# Patient Record
Sex: MALE | Race: WHITE | NOT HISPANIC OR LATINO | URBAN - METROPOLITAN AREA
[De-identification: names, ages, dates, MRNs, and addresses within clinical notes are randomized per-mention and may not be internally consistent; named-entity substitution may affect disease eponyms.]

---

## 2023-11-08 ENCOUNTER — OFFICE VISIT (OUTPATIENT)
Dept: OTOLARYNGOLOGY | Facility: CLINIC | Age: 48
End: 2023-11-08
Payer: COMMERCIAL

## 2023-11-08 ENCOUNTER — CLINICAL SUPPORT (OUTPATIENT)
Dept: AUDIOLOGY | Facility: CLINIC | Age: 48
End: 2023-11-08
Payer: COMMERCIAL

## 2023-11-08 DIAGNOSIS — H90.41 SENSORINEURAL HEARING LOSS (SNHL) OF RIGHT EAR WITH UNRESTRICTED HEARING OF LEFT EAR: ICD-10-CM

## 2023-11-08 DIAGNOSIS — H90.3 ASNHL (ASYMMETRICAL SENSORINEURAL HEARING LOSS): Primary | ICD-10-CM

## 2023-11-08 DIAGNOSIS — H93.233 HYPERACUSIS OF BOTH EARS: ICD-10-CM

## 2023-11-08 DIAGNOSIS — H93.293 ABNORMAL AUDITORY PERCEPTION OF BOTH EARS: Primary | ICD-10-CM

## 2023-11-08 DIAGNOSIS — H93.293 ABNORMAL AUDITORY PERCEPTION OF BOTH EARS: ICD-10-CM

## 2023-11-08 PROCEDURE — 92557 PR COMPREHENSIVE HEARING TEST: ICD-10-PCS | Mod: S$GLB,,, | Performed by: AUDIOLOGIST

## 2023-11-08 PROCEDURE — 92567 PR TYMPA2METRY: ICD-10-PCS | Mod: S$GLB,,, | Performed by: AUDIOLOGIST

## 2023-11-08 PROCEDURE — 99999 PR PBB SHADOW E&M-EST. PATIENT-LVL I: ICD-10-PCS | Mod: PBBFAC,,, | Performed by: AUDIOLOGIST

## 2023-11-08 PROCEDURE — 1159F MED LIST DOCD IN RCRD: CPT | Mod: CPTII,S$GLB,,

## 2023-11-08 PROCEDURE — 99203 OFFICE O/P NEW LOW 30 MIN: CPT | Mod: S$GLB,,,

## 2023-11-08 PROCEDURE — 1159F PR MEDICATION LIST DOCUMENTED IN MEDICAL RECORD: ICD-10-PCS | Mod: CPTII,S$GLB,,

## 2023-11-08 PROCEDURE — 92557 COMPREHENSIVE HEARING TEST: CPT | Mod: S$GLB,,, | Performed by: AUDIOLOGIST

## 2023-11-08 PROCEDURE — 99999 PR PBB SHADOW E&M-EST. PATIENT-LVL II: ICD-10-PCS | Mod: PBBFAC,,,

## 2023-11-08 PROCEDURE — 99999 PR PBB SHADOW E&M-EST. PATIENT-LVL II: CPT | Mod: PBBFAC,,,

## 2023-11-08 PROCEDURE — 99203 PR OFFICE/OUTPT VISIT, NEW, LEVL III, 30-44 MIN: ICD-10-PCS | Mod: S$GLB,,,

## 2023-11-08 PROCEDURE — 92567 TYMPANOMETRY: CPT | Mod: S$GLB,,, | Performed by: AUDIOLOGIST

## 2023-11-08 PROCEDURE — 99999 PR PBB SHADOW E&M-EST. PATIENT-LVL I: CPT | Mod: PBBFAC,,, | Performed by: AUDIOLOGIST

## 2023-11-08 NOTE — PROGRESS NOTES
Stewart Merlos, a 47 y.o. male, was seen today in the clinic for an audiologic evaluation.  The patient's main complaint was hearing loss.  Pt stated that he first noticed difficulty hearing at age 14.  He stated that he hs hearing testing in school that did not suggest hearing loss.  He noted sensitivity to sharp sounds.  Pt denied otalgia, aural fullness, tinnitus and vertigo.      Tympanometry revealed Type A in the right ear and Type A in the left ear.  Audiogram results revealed normal hearing bilaterally.  Slight asymmetry noted from 4950-6746 Hz only.  Speech reception thresholds were noted at 0 dB in the right ear and 5 dB in the left ear.  Speech discrimination scores were 100% in the right ear and 96% in the left ear.  The QuickSIN test was administered to evaluate hearing perception in the presence of background noise. The results of the QuickSIN revealed an SNR loss of 0.5 dBHL indicating no significant impairment in the presence of noise.      Recommendations:  Otologic evaluation  Repeat audiogram in 6 months  Hearing protection when in noise

## 2023-11-08 NOTE — PROGRESS NOTES
Subjective:   Stewart Merlos is a 47 y.o. male who presents for a hearing evaluation. He having hearing difficulty since he was 14 years. He reports noticing a gradual decrease in bilateral ears  which is getting worse along with associated hyperacusis to loud noise (trucks, etc.). He reports using hearing protection which mildly helps. Denies any otalgia, drainage, tinnitus or vertigo/imbalance. No previous audiogram recently. Had hearing test as a child which resulted in normal hearing.   There is not a family history of hearing loss at a young age. There is not a prior history of ear surgery. There is a prior history of ear infections (swimmer's ear). There is not a history of ear trauma. He admits to a history of significant noise exposure (music : no job related exposure)..     Past Medical History  He has no past medical history on file.    Past Surgical History  He has no past surgical history on file.    Family History  His family history is not on file.    Social History  He reports that he has never smoked. He has never used smokeless tobacco.    Allergies  He has No Known Allergies.    Medications  He currently has no medications in their medication list.  Review of Systems   HENT: Positive for hearing loss.  Negative for ear discharge, ear infection, ear pain, nosebleeds, postnasal drip, ringing in the ears, runny nose, sinus infection, sinus pressure, sore throat and stuffy nose.      Neurological: Negative for dizziness.          Objective:     Constitutional:   He is oriented to person, place, and time. He appears well-developed and well-nourished. He appears alert. He is cooperative.  Non-toxic appearance. He does not have a sickly appearance. He does not appear ill. Normal speech.      Head:  Normocephalic and atraumatic. Head is without right periorbital erythema, without left periorbital erythema and without TMJ tenderness. Salivary glands normal.  Facial strength is normal.      Ears:    Right  Ear: No lacerations. No drainage, swelling or tenderness. No foreign bodies. No mastoid tenderness. Tympanic membrane is not injected, not scarred, not perforated, not erythematous, not retracted and not bulging. Tympanic membrane mobility is normal. No middle ear effusion. No PE tube. No hemotympanum. Decreased hearing is noted.   Left Ear: No lacerations. No drainage, swelling or tenderness. No foreign bodies. No mastoid tenderness. Tympanic membrane is not injected, not scarred, not perforated, not erythematous, not retracted and not bulging. Tympanic membrane mobility is normal.  No middle ear effusion.  No PE tube. No hemotympanum. No decreased hearing is noted.     Nose:  No mucosal edema, rhinorrhea, sinus tenderness, septal deviation or polyps. No epistaxis. Turbinates normal, no turbinate masses and no turbinate hypertrophy.  Right sinus exhibits no maxillary sinus tenderness and no frontal sinus tenderness. Left sinus exhibits no maxillary sinus tenderness and no frontal sinus tenderness.     Mouth/Throat  Oropharynx clear and moist without lesions or asymmetry and normal uvula midline. Normal dentition. No uvula swelling, oral lesions, trismus or mucous membrane lesions. No oropharyngeal exudate, posterior oropharyngeal edema or posterior oropharyngeal erythema.     Neck:  Trachea normal, phonation normal and no adenopathy. Thyroid tenderness is present. No edema, no erythema and no neck rigidity present. No thyroid mass and no thyromegaly present.     He has no cervical adenopathy.     Pulmonary/Chest:   Effort normal.     Psychiatric:   He has a normal mood and affect. His speech is normal and behavior is normal.     Neurological:   He is alert and oriented to person, place, and time. He has neurological normal, alert and oriented. No cranial nerve deficit or sensory deficit.         Audiogram    I independently reviewed the tracings of the complete audiometric evaluation performed today. I reviewed the  audiogram with the patient as well. Pertinent findings include : Tympanometry revealed Type A in the right ear and Type A in the left ear.  Audiogram results revealed normal hearing bilaterally.  Slight asymmetry noted from 7898-2314 Hz only.  Speech reception thresholds were noted at 0 dB in the right ear and 5 dB in the left ear   Assessment:     1. ASNHL (asymmetrical sensorineural hearing loss)    2. Sensorineural hearing loss (SNHL) of right ear with unrestricted hearing of left ear    3. Hyperacusis of both ears    4. Abnormal auditory perception of both ears      Plan:   Stewart was seen today for hearing loss.    Diagnoses and all orders for this visit:    ASNHL (asymmetrical sensorineural hearing loss)  Recommended a follow repeat audiogram in 6 months. Possible MRI IAC  if he meets criteria next visit to assess asymmetry  Follow up with Dr. Ortiz or Dr Reed.   Sensorineural hearing loss (SNHL), right ear   Audiometric testing interpretation consistent with sensorineural hearing loss.  Discussed the etiology of SNHL.. Hearing conservation in noisy environments. Return to clinic every for audiometric testing.;  Hyperacusis of both ears  Recommended sound canceling apps.   Hearing protection recommended.  Consultation with audiology for behavioral options to help with managing hypercusis.   Abnormal auditory perception of both ears.   RTC as needed or if symptoms persist.  Questions answered.

## 2023-11-16 ENCOUNTER — CLINICAL SUPPORT (OUTPATIENT)
Dept: AUDIOLOGY | Facility: CLINIC | Age: 48
End: 2023-11-16
Payer: COMMERCIAL

## 2023-11-16 DIAGNOSIS — H93.293 ABNORMAL AUDITORY PERCEPTION OF BOTH EARS: Primary | ICD-10-CM

## 2023-11-16 NOTE — PROGRESS NOTES
Stewart Merlos, a 47 y.o. male, was seen today for a hearing aid consultation.  We discussed the patients hearing test results.  Pt stated the hearing he has difficulty understanding speech in a noisy environment.  Hearing aid options were presented.  We discussed a low gain hearing aid to attempt to improve speech understanding in noise and to help reduce listening strain.  We discussed that he may or may not notice a benefit from aids.  He decided that he would like to try a low gain set of hearing aids to determine if they could be beneficial.  Pt selected a set of entry level aids based on recommendations.  A hearing aid order form was completed and signed.  Patient acknowledged understanding of the 30 day trial period, $250 restocking fee from the time of order, and the fact that we do not file insurance on behalf of the patient.  Patient will return for a hearing aid evaluation in two weeks.

## 2023-11-28 ENCOUNTER — CLINICAL SUPPORT (OUTPATIENT)
Dept: AUDIOLOGY | Facility: CLINIC | Age: 48
End: 2023-11-28
Payer: COMMERCIAL

## 2023-11-28 DIAGNOSIS — H93.293 ABNORMAL AUDITORY PERCEPTION OF BOTH EARS: Primary | ICD-10-CM

## 2023-11-28 NOTE — PROGRESS NOTES
Date:  11/28/2023    Patient:  Stewart Merlos    Pt seen today for a hearing aid evaluation.  He was fit with the following device.      Hearing Aid Information:  Phonak L30-RT   Graphite Gray   LT SN: [id number]   RT SN: [id number]    5: S1   Dome: Medium open   SN: [id number]   Warranty Exp: 02/13/2027     Fit good and patient reported good subjective benefit.  Proper care and maintenance was discussed.  Purchase agreement was reviewed and signed.  Patient acknowledged understanding of the 30 day trial period, $250 restocking fee, and the fact that we do not file insurance on behalf of the patient. Pt should follow up in two weeks.

## 2023-12-31 ENCOUNTER — PATIENT MESSAGE (OUTPATIENT)
Dept: AUDIOLOGY | Facility: CLINIC | Age: 48
End: 2023-12-31
Payer: COMMERCIAL

## 2024-01-09 ENCOUNTER — CLINICAL SUPPORT (OUTPATIENT)
Dept: AUDIOLOGY | Facility: CLINIC | Age: 49
End: 2024-01-09
Payer: COMMERCIAL

## 2024-01-09 DIAGNOSIS — H93.293 ABNORMAL AUDITORY PERCEPTION OF BOTH EARS: Primary | ICD-10-CM

## 2024-01-09 PROCEDURE — 99499 UNLISTED E&M SERVICE: CPT | Mod: S$GLB,,, | Performed by: AUDIOLOGIST

## 2024-01-09 NOTE — PROGRESS NOTES
Stewart Merlos, a 48 y.o. male, was seen today for a one month hearing aid cleaning and check.  Pt reported that his aid(s) are generally working well and that he feels that sound is less harsh.  He noted that his wife feels that he is more responsive and that he is hearing better.  He noted that the right aid has not been charging correctly and he is not sure it is working properly at the moment.  Aid(s) cleaned and checked.   and charging ports Listening check confirmed aid(s) working well.  Aids were both charging well in the office.  Data logging confirmed regular use and appropriate technology level.  We paired the aids to his phone for streaming and shankar functions.  Use and functions were discussed.  Pt will follow up annually unless otherwise needed.    Hearing Aid Information:  Phonak L30-RT   Graphite Gray   LT SN: [id number]   RT SN: [id number]    5: S1   Dome: Medium open   SN: [id number]   Warranty Exp: 02/13/2027     Recommendations:  Daily use of hearing aids  Return for annual hearing testing and hearing  Contact office if issues arise

## 2024-10-09 ENCOUNTER — PATIENT MESSAGE (OUTPATIENT)
Dept: AUDIOLOGY | Facility: CLINIC | Age: 49
End: 2024-10-09
Payer: COMMERCIAL

## 2024-11-12 ENCOUNTER — CLINICAL SUPPORT (OUTPATIENT)
Dept: AUDIOLOGY | Facility: CLINIC | Age: 49
End: 2024-11-12
Payer: COMMERCIAL

## 2024-11-12 DIAGNOSIS — H93.293 ABNORMAL AUDITORY PERCEPTION OF BOTH EARS: Primary | ICD-10-CM

## 2024-11-12 PROCEDURE — 92567 TYMPANOMETRY: CPT | Mod: S$GLB,,, | Performed by: AUDIOLOGIST

## 2024-11-12 PROCEDURE — 99999 PR PBB SHADOW E&M-EST. PATIENT-LVL I: CPT | Mod: PBBFAC,,, | Performed by: AUDIOLOGIST

## 2024-11-12 PROCEDURE — 92557 COMPREHENSIVE HEARING TEST: CPT | Mod: S$GLB,,, | Performed by: AUDIOLOGIST

## 2024-11-12 PROCEDURE — 99499 UNLISTED E&M SERVICE: CPT | Mod: S$GLB,,, | Performed by: AUDIOLOGIST

## 2024-11-12 NOTE — PROGRESS NOTES
Stewart Merlos, a 48 y.o. male, was seen today for a hearing aid check to  aids and  from repair.  Aids were sent for  repair due to improper charging and bluetooth connection.  Listening check confirmed aids working well.   New feedback testing was complete.  Pt stated that he reduces the volume one click every day.  This change was made in settings.  Pt confirmed good subjective benefit.  We reconnected bluetooth and reviewed the shankar.  Pt will follow up annually unless issues arise.      Hearing Aid Information:  Hearing Aid Information:   Phonak L30-RT   Graphite Gray   LT SN: [id number]   RT SN: [id number]    5: S1   Dome: Medium open    SN: [id number]   Warranty Exp: 02/13/2027     Action Taken:   Gen clean up, Replaced electronics and      Recommendations:  Daily use of hearing aids  Return for annual hearing testing and hearing  Contact office if issues arise

## 2024-11-12 NOTE — PROGRESS NOTES
Stewart Merlos, a 48 y.o. male, was seen today in the clinic for an audiologic evaluation.  The patient's main complaint was difficulty hearing in noise.  Pt has history of difficulty hearing and currently wears bilateral low gain hearing aids.  Pt denied any change in ears since last tested.  He denied otalgia, aural fullness, tinnitus and vertigo.      Tympanometry revealed Type A in the right ear and Type A in the left ear.  Audiogram results revealed normal hearing bilaterally.  Speech reception thresholds were noted at 10 dB in the right ear and 10 dB in the left ear.  Speech discrimination scores were 96% in the right ear and 100% in the left ear.    Recommendations:  Otologic evaluation  Annual audiogram  Hearing protection when in noise  Hearing aid follow up

## 2025-07-30 ENCOUNTER — PATIENT MESSAGE (OUTPATIENT)
Dept: AUDIOLOGY | Facility: CLINIC | Age: 50
End: 2025-07-30
Payer: COMMERCIAL